# Patient Record
Sex: MALE | Race: WHITE | Employment: OTHER | URBAN - NONMETROPOLITAN AREA
[De-identification: names, ages, dates, MRNs, and addresses within clinical notes are randomized per-mention and may not be internally consistent; named-entity substitution may affect disease eponyms.]

---

## 2017-10-17 ENCOUNTER — HOSPITAL ENCOUNTER (INPATIENT)
Age: 77
LOS: 1 days | Discharge: HOME | DRG: 313 | End: 2017-10-18
Attending: EMERGENCY MEDICINE | Admitting: INTERNAL MEDICINE
Payer: MEDICARE

## 2017-10-17 ENCOUNTER — APPOINTMENT (OUTPATIENT)
Dept: INTERVENTIONAL RADIOLOGY/VASCULAR | Age: 77
DRG: 313 | End: 2017-10-17
Payer: MEDICARE

## 2017-10-17 ENCOUNTER — APPOINTMENT (OUTPATIENT)
Dept: CT IMAGING | Age: 77
DRG: 313 | End: 2017-10-17
Payer: MEDICARE

## 2017-10-17 ENCOUNTER — APPOINTMENT (OUTPATIENT)
Dept: GENERAL RADIOLOGY | Age: 77
DRG: 313 | End: 2017-10-17
Payer: MEDICARE

## 2017-10-17 DIAGNOSIS — R91.1 PULMONARY NODULE: ICD-10-CM

## 2017-10-17 DIAGNOSIS — R79.89 CREATININE ELEVATION: ICD-10-CM

## 2017-10-17 DIAGNOSIS — Z95.1 HISTORY OF CORONARY ARTERY BYPASS SURGERY: ICD-10-CM

## 2017-10-17 DIAGNOSIS — Z72.0 TOBACCO ABUSE: ICD-10-CM

## 2017-10-17 DIAGNOSIS — I10 ESSENTIAL HYPERTENSION: ICD-10-CM

## 2017-10-17 DIAGNOSIS — R91.1 INCIDENTAL PULMONARY NODULE, GREATER THAN OR EQUAL TO 8MM: Primary | ICD-10-CM

## 2017-10-17 DIAGNOSIS — R07.9 CHEST PAIN, UNSPECIFIED TYPE: ICD-10-CM

## 2017-10-17 PROBLEM — R09.89 ABSENT PULSE: Status: ACTIVE | Noted: 2017-10-17

## 2017-10-17 PROBLEM — K40.90 INGUINAL HERNIA: Status: ACTIVE | Noted: 2017-10-17

## 2017-10-17 PROBLEM — K46.9 HERNIA OF ABDOMINAL CAVITY: Status: ACTIVE | Noted: 2017-10-17

## 2017-10-17 LAB
ALBUMIN SERPL-MCNC: 4.6 G/DL (ref 3.5–5.1)
ALP BLD-CCNC: 77 U/L (ref 38–126)
ALT SERPL-CCNC: 17 U/L (ref 11–66)
ANION GAP SERPL CALCULATED.3IONS-SCNC: 13 MEQ/L (ref 8–16)
AST SERPL-CCNC: 24 U/L (ref 5–40)
BASOPHILS # BLD: 0.3 %
BASOPHILS # BLD: 0.3 %
BASOPHILS ABSOLUTE: 0 THOU/MM3 (ref 0–0.1)
BASOPHILS ABSOLUTE: 0 THOU/MM3 (ref 0–0.1)
BILIRUB SERPL-MCNC: 1.3 MG/DL (ref 0.3–1.2)
BILIRUBIN DIRECT: < 0.2 MG/DL (ref 0–0.3)
BUN BLDV-MCNC: 23 MG/DL (ref 7–22)
CALCIUM SERPL-MCNC: 10 MG/DL (ref 8.5–10.5)
CHLORIDE BLD-SCNC: 101 MEQ/L (ref 98–111)
CO2: 28 MEQ/L (ref 23–33)
CREAT SERPL-MCNC: 1.1 MG/DL (ref 0.4–1.2)
EKG ATRIAL RATE: 54 BPM
EKG ATRIAL RATE: 86 BPM
EKG P-R INTERVAL: 204 MS
EKG Q-T INTERVAL: 422 MS
EKG Q-T INTERVAL: 428 MS
EKG QRS DURATION: 106 MS
EKG QRS DURATION: 108 MS
EKG QTC CALCULATION (BAZETT): 400 MS
EKG QTC CALCULATION (BAZETT): 455 MS
EKG R AXIS: -26 DEGREES
EKG R AXIS: -32 DEGREES
EKG T AXIS: 17 DEGREES
EKG T AXIS: 38 DEGREES
EKG VENTRICULAR RATE: 54 BPM
EKG VENTRICULAR RATE: 68 BPM
EOSINOPHIL # BLD: 2 %
EOSINOPHIL # BLD: 2.1 %
EOSINOPHILS ABSOLUTE: 0.2 THOU/MM3 (ref 0–0.4)
EOSINOPHILS ABSOLUTE: 0.2 THOU/MM3 (ref 0–0.4)
GFR SERPL CREATININE-BSD FRML MDRD: 65 ML/MIN/1.73M2
GLUCOSE BLD-MCNC: 101 MG/DL (ref 70–108)
HCT VFR BLD CALC: 41.3 % (ref 42–52)
HCT VFR BLD CALC: 44.4 % (ref 42–52)
HEMOGLOBIN: 14.4 GM/DL (ref 14–18)
HEMOGLOBIN: 14.9 GM/DL (ref 14–18)
LIPASE: 70.3 U/L (ref 5.6–51.3)
LV EF: 58 %
LVEF MODALITY: NORMAL
LYMPHOCYTES # BLD: 21.1 %
LYMPHOCYTES # BLD: 21.9 %
LYMPHOCYTES ABSOLUTE: 1.7 THOU/MM3 (ref 1–4.8)
LYMPHOCYTES ABSOLUTE: 1.9 THOU/MM3 (ref 1–4.8)
MAGNESIUM: 2.3 MG/DL (ref 1.6–2.4)
MCH RBC QN AUTO: 30.4 PG (ref 27–31)
MCH RBC QN AUTO: 31.6 PG (ref 27–31)
MCHC RBC AUTO-ENTMCNC: 33.5 GM/DL (ref 33–37)
MCHC RBC AUTO-ENTMCNC: 34.8 GM/DL (ref 33–37)
MCV RBC AUTO: 90.7 FL (ref 80–94)
MCV RBC AUTO: 90.9 FL (ref 80–94)
MONOCYTES # BLD: 8.1 %
MONOCYTES # BLD: 9.3 %
MONOCYTES ABSOLUTE: 0.6 THOU/MM3 (ref 0.4–1.3)
MONOCYTES ABSOLUTE: 0.8 THOU/MM3 (ref 0.4–1.3)
NUCLEATED RED BLOOD CELLS: 0 /100 WBC
NUCLEATED RED BLOOD CELLS: 0 /100 WBC
OSMOLALITY CALCULATION: 286.9 MOSMOL/KG (ref 275–300)
PDW BLD-RTO: 13.3 % (ref 11.5–14.5)
PDW BLD-RTO: 14 % (ref 11.5–14.5)
PLATELET # BLD: 203 THOU/MM3 (ref 130–400)
PLATELET # BLD: 232 THOU/MM3 (ref 130–400)
PMV BLD AUTO: 9.3 MCM (ref 7.4–10.4)
PMV BLD AUTO: 9.5 MCM (ref 7.4–10.4)
POTASSIUM SERPL-SCNC: 4.4 MEQ/L (ref 3.5–5.2)
PRO-BNP: 575.2 PG/ML (ref 0–1800)
RBC # BLD: 4.55 MILL/MM3 (ref 4.7–6.1)
RBC # BLD: 4.89 MILL/MM3 (ref 4.7–6.1)
RBC # BLD: NORMAL 10*6/UL
RBC # BLD: NORMAL 10*6/UL
SEG NEUTROPHILS: 67.3 %
SEG NEUTROPHILS: 67.6 %
SEGMENTED NEUTROPHILS ABSOLUTE COUNT: 5.3 THOU/MM3 (ref 1.8–7.7)
SEGMENTED NEUTROPHILS ABSOLUTE COUNT: 6 THOU/MM3 (ref 1.8–7.7)
SODIUM BLD-SCNC: 142 MEQ/L (ref 135–145)
TOTAL PROTEIN: 7.7 G/DL (ref 6.1–8)
TROPONIN T: < 0.01 NG/ML
WBC # BLD: 7.9 THOU/MM3 (ref 4.8–10.8)
WBC # BLD: 8.9 THOU/MM3 (ref 4.8–10.8)

## 2017-10-17 PROCEDURE — 93922 UPR/L XTREMITY ART 2 LEVELS: CPT

## 2017-10-17 PROCEDURE — 83690 ASSAY OF LIPASE: CPT

## 2017-10-17 PROCEDURE — 82248 BILIRUBIN DIRECT: CPT

## 2017-10-17 PROCEDURE — 96374 THER/PROPH/DIAG INJ IV PUSH: CPT

## 2017-10-17 PROCEDURE — 6360000002 HC RX W HCPCS: Performed by: HOSPITALIST

## 2017-10-17 PROCEDURE — B32T1ZZ COMPUTERIZED TOMOGRAPHY (CT SCAN) OF LEFT PULMONARY ARTERY USING LOW OSMOLAR CONTRAST: ICD-10-PCS | Performed by: RADIOLOGY

## 2017-10-17 PROCEDURE — 99221 1ST HOSP IP/OBS SF/LOW 40: CPT | Performed by: SURGERY

## 2017-10-17 PROCEDURE — 99285 EMERGENCY DEPT VISIT HI MDM: CPT

## 2017-10-17 PROCEDURE — 83880 ASSAY OF NATRIURETIC PEPTIDE: CPT

## 2017-10-17 PROCEDURE — 99221 1ST HOSP IP/OBS SF/LOW 40: CPT | Performed by: INTERNAL MEDICINE

## 2017-10-17 PROCEDURE — 85025 COMPLETE CBC W/AUTO DIFF WBC: CPT

## 2017-10-17 PROCEDURE — 36415 COLL VENOUS BLD VENIPUNCTURE: CPT

## 2017-10-17 PROCEDURE — 71275 CT ANGIOGRAPHY CHEST: CPT

## 2017-10-17 PROCEDURE — 99223 1ST HOSP IP/OBS HIGH 75: CPT | Performed by: INTERNAL MEDICINE

## 2017-10-17 PROCEDURE — 6370000000 HC RX 637 (ALT 250 FOR IP): Performed by: INTERNAL MEDICINE

## 2017-10-17 PROCEDURE — 93306 TTE W/DOPPLER COMPLETE: CPT

## 2017-10-17 PROCEDURE — 93005 ELECTROCARDIOGRAM TRACING: CPT | Performed by: EMERGENCY MEDICINE

## 2017-10-17 PROCEDURE — 6360000002 HC RX W HCPCS: Performed by: EMERGENCY MEDICINE

## 2017-10-17 PROCEDURE — 6370000000 HC RX 637 (ALT 250 FOR IP): Performed by: HOSPITALIST

## 2017-10-17 PROCEDURE — 74174 CTA ABD&PLVS W/CONTRAST: CPT

## 2017-10-17 PROCEDURE — 84484 ASSAY OF TROPONIN QUANT: CPT

## 2017-10-17 PROCEDURE — 6360000004 HC RX CONTRAST MEDICATION: Performed by: INTERNAL MEDICINE

## 2017-10-17 PROCEDURE — B32S1ZZ COMPUTERIZED TOMOGRAPHY (CT SCAN) OF RIGHT PULMONARY ARTERY USING LOW OSMOLAR CONTRAST: ICD-10-PCS | Performed by: RADIOLOGY

## 2017-10-17 PROCEDURE — B4201ZZ COMPUTERIZED TOMOGRAPHY (CT SCAN) OF ABDOMINAL AORTA USING LOW OSMOLAR CONTRAST: ICD-10-PCS | Performed by: RADIOLOGY

## 2017-10-17 PROCEDURE — 83735 ASSAY OF MAGNESIUM: CPT

## 2017-10-17 PROCEDURE — 71010 XR CHEST PORTABLE: CPT

## 2017-10-17 PROCEDURE — 6370000000 HC RX 637 (ALT 250 FOR IP): Performed by: EMERGENCY MEDICINE

## 2017-10-17 PROCEDURE — C9113 INJ PANTOPRAZOLE SODIUM, VIA: HCPCS | Performed by: EMERGENCY MEDICINE

## 2017-10-17 PROCEDURE — B3201ZZ COMPUTERIZED TOMOGRAPHY (CT SCAN) OF THORACIC AORTA USING LOW OSMOLAR CONTRAST: ICD-10-PCS | Performed by: RADIOLOGY

## 2017-10-17 PROCEDURE — 96375 TX/PRO/DX INJ NEW DRUG ADDON: CPT

## 2017-10-17 PROCEDURE — 99232 SBSQ HOSP IP/OBS MODERATE 35: CPT | Performed by: HOSPITALIST

## 2017-10-17 PROCEDURE — 2140000000 HC CCU INTERMEDIATE R&B

## 2017-10-17 PROCEDURE — 80053 COMPREHEN METABOLIC PANEL: CPT

## 2017-10-17 RX ORDER — METOPROLOL SUCCINATE 50 MG/1
37.5 TABLET, EXTENDED RELEASE ORAL DAILY
Status: ON HOLD | COMMUNITY
End: 2017-10-17 | Stop reason: DRUGHIGH

## 2017-10-17 RX ORDER — METOPROLOL SUCCINATE 25 MG/1
25 TABLET, EXTENDED RELEASE ORAL DAILY
Status: DISCONTINUED | OUTPATIENT
Start: 2017-10-17 | End: 2017-10-17

## 2017-10-17 RX ORDER — METOPROLOL SUCCINATE 50 MG/1
50 TABLET, EXTENDED RELEASE ORAL DAILY
Status: DISCONTINUED | OUTPATIENT
Start: 2017-10-17 | End: 2017-10-17

## 2017-10-17 RX ORDER — ONDANSETRON 2 MG/ML
4 INJECTION INTRAMUSCULAR; INTRAVENOUS ONCE
Status: COMPLETED | OUTPATIENT
Start: 2017-10-17 | End: 2017-10-17

## 2017-10-17 RX ORDER — METOPROLOL TARTRATE 50 MG/1
25 TABLET, FILM COATED ORAL 2 TIMES DAILY
COMMUNITY

## 2017-10-17 RX ORDER — HYDRALAZINE HYDROCHLORIDE 20 MG/ML
10 INJECTION INTRAMUSCULAR; INTRAVENOUS EVERY 6 HOURS PRN
Status: DISCONTINUED | OUTPATIENT
Start: 2017-10-17 | End: 2017-10-17 | Stop reason: HOSPADM

## 2017-10-17 RX ORDER — LOVASTATIN 40 MG/1
40 TABLET ORAL NIGHTLY
Status: DISCONTINUED | OUTPATIENT
Start: 2017-10-17 | End: 2017-10-17 | Stop reason: CLARIF

## 2017-10-17 RX ORDER — SIMVASTATIN 20 MG
20 TABLET ORAL NIGHTLY
Status: DISCONTINUED | OUTPATIENT
Start: 2017-10-17 | End: 2017-10-18 | Stop reason: HOSPADM

## 2017-10-17 RX ORDER — NITROGLYCERIN 0.4 MG/1
0.4 TABLET SUBLINGUAL EVERY 5 MIN PRN
Status: DISCONTINUED | OUTPATIENT
Start: 2017-10-17 | End: 2017-10-18 | Stop reason: HOSPADM

## 2017-10-17 RX ORDER — LOVASTATIN 40 MG/1
40 TABLET ORAL NIGHTLY
COMMUNITY

## 2017-10-17 RX ORDER — PANTOPRAZOLE SODIUM 40 MG/10ML
40 INJECTION, POWDER, LYOPHILIZED, FOR SOLUTION INTRAVENOUS ONCE
Status: COMPLETED | OUTPATIENT
Start: 2017-10-17 | End: 2017-10-17

## 2017-10-17 RX ORDER — AMLODIPINE BESYLATE 2.5 MG/1
2.5 TABLET ORAL NIGHTLY
Status: DISCONTINUED | OUTPATIENT
Start: 2017-10-17 | End: 2017-10-18 | Stop reason: HOSPADM

## 2017-10-17 RX ADMIN — ONDANSETRON 4 MG: 2 INJECTION INTRAMUSCULAR; INTRAVENOUS at 02:30

## 2017-10-17 RX ADMIN — IOPAMIDOL 80 ML: 755 INJECTION, SOLUTION INTRAVENOUS at 04:11

## 2017-10-17 RX ADMIN — AMLODIPINE BESYLATE 2.5 MG: 2.5 TABLET ORAL at 21:13

## 2017-10-17 RX ADMIN — METOPROLOL TARTRATE 25 MG: 25 TABLET ORAL at 21:13

## 2017-10-17 RX ADMIN — SIMVASTATIN 20 MG: 20 TABLET, FILM COATED ORAL at 21:13

## 2017-10-17 RX ADMIN — NITROGLYCERIN 0.4 MG: 0.4 TABLET SUBLINGUAL at 02:27

## 2017-10-17 RX ADMIN — PANTOPRAZOLE SODIUM 40 MG: 40 INJECTION, POWDER, FOR SOLUTION INTRAVENOUS at 02:32

## 2017-10-17 RX ADMIN — ENOXAPARIN SODIUM 40 MG: 40 INJECTION SUBCUTANEOUS at 16:58

## 2017-10-17 RX ADMIN — METOPROLOL SUCCINATE 25 MG: 25 TABLET, FILM COATED, EXTENDED RELEASE ORAL at 09:12

## 2017-10-17 ASSESSMENT — ENCOUNTER SYMPTOMS
EYE DISCHARGE: 0
VOICE CHANGE: 0
CONSTIPATION: 0
CHEST TIGHTNESS: 0
VOMITING: 0
ABDOMINAL PAIN: 0
CHOKING: 0
EYE REDNESS: 0
DIARRHEA: 0
SORE THROAT: 0
SINUS PRESSURE: 0
EYE ITCHING: 0
SHORTNESS OF BREATH: 0
COUGH: 0
ABDOMINAL DISTENTION: 0
RHINORRHEA: 0
NAUSEA: 0
WHEEZING: 0
PHOTOPHOBIA: 0
TROUBLE SWALLOWING: 0
BLOOD IN STOOL: 0
EYE PAIN: 0
BACK PAIN: 0

## 2017-10-17 ASSESSMENT — PAIN SCALES - GENERAL
PAINLEVEL_OUTOF10: 2
PAINLEVEL_OUTOF10: 0
PAINLEVEL_OUTOF10: 0
PAINLEVEL_OUTOF10: 6

## 2017-10-17 ASSESSMENT — PAIN DESCRIPTION - LOCATION
LOCATION: CHEST
LOCATION: CHEST

## 2017-10-17 ASSESSMENT — PAIN DESCRIPTION - DESCRIPTORS
DESCRIPTORS: HEAVINESS
DESCRIPTORS: HEAVINESS

## 2017-10-17 ASSESSMENT — PAIN DESCRIPTION - PAIN TYPE
TYPE: ACUTE PAIN
TYPE: ACUTE PAIN

## 2017-10-17 NOTE — CONSULTS
shows irregular 13mm nodule RUL - pulmonary was consulted for further eval.  Past Medical History         Diagnosis Date    Hyperlipidemia     Hypertension       Past Surgical History           Procedure Laterality Date    CARDIAC SURGERY      FRACTURE SURGERY      HERNIA REPAIR       Diet    DIET CARDIAC; Allergies    Aspirin  Social History     Social History     Social History    Marital status:      Spouse name: N/A    Number of children: N/A    Years of education: N/A     Occupational History    Not on file. Social History Main Topics    Smoking status: Former Smoker    Smokeless tobacco: Never Used    Alcohol use No    Drug use: No    Sexual activity: Not on file     Other Topics Concern    Not on file     Social History Narrative    No narrative on file     Family History    History reviewed. No pertinent family history. Sleep History    n/a  ROS    General/Constitutional: No recent loss of weight or appetite changes. No fever or chills. HENT: Negative. Eyes: Negative. Upper respiratory tract: No nasal stuffiness or post nasal drip. Lower respiratory tract/ lungs: No cough or sputum production. No hemoptysis. Cardiovascular: No palpitations, chest pain or edema. Gastrointestinal: No nausea or vomiting. Neurological: No focal neurological weakness. Extremities: No tenderness. Musculoskeletal: negative   Genitourinary: No complaints. Hematological: Negative. Denies easy buising  Skin: No itching. Meds    Current Medications    metoprolol succinate  25 mg Oral Daily    simvastatin  20 mg Oral Nightly     nitroGLYCERIN  IV Drips/Infusions     Vitals    Vitals    height is 5' 11\" (1.803 m) and weight is 157 lb 14.4 oz (71.6 kg). His oral temperature is 98 °F (36.7 °C). His blood pressure is 148/70 (abnormal) and his pulse is 51. His respiration is 20 and oxygen saturation is 98%.         I/O  No intake or output data in the 24 hours ending 10/17/17 0818  Patient Vitals for the past 96 hrs (Last 3 readings):   Weight   10/17/17 0444 157 lb 14.4 oz (71.6 kg)   10/17/17 0211 155 lb (70.3 kg)     Exam   Constitutional: Patient appears moderately built and moderately nourished. No distress on RA   Head: Normocephalic and atraumatic. Mouth/Throat: Oropharynx is clear and moist.  No oral thrush. Eyes: Conjunctivae are normal. Pupils are equal, round, and reactive to light. No scleral icterus. Neck: Neck supple. No JVD or tracheal deviation present. Cardiovascular: Regular rate, irregular rhythm, S1 and S2 with no murmur. No peripheral edema  Pulmonary/Chest: Normal effort with clear breath sounds. No stridor. No respiratory distress. Abdominal: Soft. Bowel sounds audible. No distension or tenderness to palp  Musculoskeletal: Moves all extremities  Neurological: Patient is alert and oriented to person, place, and time. Skin: Skin is warm and dry. Labs   ABG  No results found for: PH, PO2, PCO2, HCO3, O2SAT  No results found for: IFIO2, MODE, SETTIDVOL, SETPEEP  CBC  Recent Labs      10/17/17   0151  10/17/17   0544   WBC  8.9  7.9   RBC  4.89  4.55*   HGB  14.9  14.4   HCT  44.4  41.3*   MCV  90.7  90.9   MCH  30.4  31.6*   MCHC  33.5  34.8   RDW  14.0  13.3   PLT  232  203   MPV  9.3  9.5      BMP  Recent Labs      10/17/17   0151   NA  142   K  4.4   CL  101   CO2  28   BUN  23*   CREATININE  1.1   GLUCOSE  101   MG  2.3   CALCIUM  10.0     LFT  Recent Labs      10/17/17   0151   AST  24   ALT  17   BILITOT  1.3*   ALKPHOS  77   LIPASE  70.3*     TROP  Lab Results   Component Value Date    TROPONINT < 0.010 10/17/2017     BNP  Lab Results   Component Value Date    PROBNP 575.2 10/17/2017     D-Dimer  No results found for: DDIMER  Lactic Acid  No results for input(s): LACTA in the last 72 hours. INR  No results for input(s): INR, PROTIME in the last 72 hours. PTT  No results for input(s): APTT in the last 72 hours.   Glucose  No results for input(s): POCGLU in the last 72 oxygenating well on RA   -No s/s of current infectious process- does have history c/w possible pneumonia in recent past, treated by his PCP- this nodule may be resolving CAP. -Recommend f/u in 3 months with repeat CT w/ contrast to follow lung nodule    Thank you for the consult and allowing us to participate in the care of your patient. Case discussed with nurse and patient/family. Questions and concerns addressed. Meds and Orders reviewed. Electronically signed by     Niranjan Oviedo CNP on 10/17/2017 at 8:18 AM    Addendum by Dr. Jordan Zamora MD:  I have seen and examined the patient independently. Face to face evaluation and examination was performed. The above evaluation and note has been reviewed. Labs and radiographs were reviewed. I Have discussed with Ms. Evelyn Vallecillo CNP about this patient in detail. D/w Patient's R.N. and specific instructions given. Patient issues addressed. The above assessment and plan has been reviewed. Please see my modifications mentioned below. My modifications:  No distress. The nodule may be resolving pneumonia. Follow up as above along with full PFTS. -Follow with my ( Dr. Jeffrey Pearson) clinic at [facility] for pulmonary medicine in  3months with chest CT with IV contrast and full PFTs before clinic visit to follow pneumonia Vs lung nodule.     Carlos Cobos MD 10/17/2017 7:10 PM

## 2017-10-17 NOTE — ED PROVIDER NOTES
[facility]  eMERGENCY dEPARTMENT eNCOUnter          CHIEF COMPLAINT       Chief Complaint   Patient presents with    Chest Pain    Hypertension    Fatigue       Nurses Notes reviewed and I agree except as noted in the HPI. HISTORY OF PRESENT ILLNESS    Natalia Esposito is a 64 y.o. male who presents to the Emergency Department for the evaluation of chest pain. Patient has a history of Hypertension, Hyperlipidemia, and Cardiac surgery (quadruple bypass in 80' and triple bypass in 11'). Patient states he has had issues with chest pressure over the last year. He states most nights he wakes up with issues every 1-2 hours with chest pressure and difficulty exhaling. Patient states lately blood pressure has been running high. Patient currently complains of chest heaviness and chills. He describes his pain as being constant and heaviness and rates it a 6/10 in severity. He denies any syncope, shortness of breath, fever, cough, dizziness, or lightheadedness. Patient states he has been taking blood pressure medications as prescribed. Patient states he has an appointment with Dr. Kristina Fall tomorrow. Patient states cardiologist is Dr. Juan Ramirez. Patient has no other complaints at this time. The HPI was provided by the patient. REVIEW OF SYSTEMS     Review of Systems   Constitutional: Positive for chills. Negative for activity change, appetite change, diaphoresis, fatigue and unexpected weight change. HENT: Negative for congestion, ear discharge, ear pain, hearing loss, rhinorrhea, sinus pressure, sore throat, trouble swallowing and voice change. Eyes: Negative for photophobia, pain, discharge, redness and itching. Respiratory: Negative for cough, choking, chest tightness, shortness of breath and wheezing. Cardiovascular: Positive for chest pain (heaviness). Negative for palpitations and leg swelling.    Gastrointestinal: Negative for abdominal distention, abdominal pain, blood in stool, constipation, diarrhea, nausea and vomiting. Endocrine: Negative for polydipsia, polyphagia and polyuria. Genitourinary: Negative for decreased urine volume, difficulty urinating, dysuria, enuresis, frequency, hematuria and urgency. Musculoskeletal: Negative for arthralgias, back pain, gait problem, myalgias, neck pain and neck stiffness. Skin: Negative for pallor and rash. Allergic/Immunologic: Negative for immunocompromised state. Neurological: Negative for dizziness, tremors, seizures, syncope, facial asymmetry, weakness, light-headedness, numbness and headaches. Hematological: Negative for adenopathy. Does not bruise/bleed easily. Psychiatric/Behavioral: Negative for agitation, hallucinations and suicidal ideas. The patient is not nervous/anxious. PAST MEDICAL HISTORY    has a past medical history of Hyperlipidemia and Hypertension. SURGICAL HISTORY      has a past surgical history that includes Cardiac surgery; hernia repair; and fracture surgery. CURRENT MEDICATIONS       Previous Medications    LOVASTATIN (MEVACOR) 40 MG TABLET    Take 40 mg by mouth nightly    METOPROLOL SUCCINATE (TOPROL XL) 50 MG EXTENDED RELEASE TABLET    Take 50 mg by mouth daily Take 1/2 tab by mouth two times a day       ALLERGIES     has No Known Allergies. FAMILY HISTORY     has no family status information on file. family history is not on file. SOCIAL HISTORY      reports that he has quit smoking. He has never used smokeless tobacco. He reports that he does not drink alcohol or use drugs. PHYSICAL EXAM     INITIAL VITALS:  height is 5' 11\" (1.803 m) and weight is 155 lb (70.3 kg). His oral temperature is 97.8 °F (36.6 °C). His blood pressure is 157/66 (abnormal) and his pulse is 52. His respiration is 16 and oxygen saturation is 98%. Physical Exam   Constitutional: He is oriented to person, place, and time. He appears well-developed and well-nourished. No distress.    HENT:   Head: Patient does have bradycardia here his EKG was strange. It appears that he is in sinus 1 minute and then he has sinus arrhythmia another minute. His NJ interval does appear elongated on several of the EKGs. At this point I called and spoke with cardiology with Dr. Ashwini Stern, and discussed case with him. He graciously accepted the patient as consult. I then called and spoke with Dr. España Morning and discussed case with him he graciously accepted the patient for admission. Patient will be admitted in stable condition to the service of hospitalist with cardiology consult and place. CRITICAL CARE:   None     CONSULTS:  Dr Ashwini Stern    PROCEDURES:  None    FINAL IMPRESSION      1. Chest pain, unspecified type    2. History of coronary artery bypass surgery    3. Essential hypertension          DISPOSITION/PLAN   Admission    PATIENT REFERRED TO:  No follow-up provider specified. DISCHARGE MEDICATIONS:  New Prescriptions    No medications on file       (Please note that portions of this note were completed with a voice recognition program.  Efforts were made to edit the dictations but occasionally words are mis-transcribed.)    Scribe:  Lucio Elizondo 10/17/17 2:11 AM Scribing for and in the presence of Adolfo Luna DO. Signed: Edmund Adams. 10/17/17 2:11 AM    Provider:  I personally performed the services described in the documentation, reviewed and edited the documentation which was dictated to the scribe in my presence, and it accurately records my words and actions.     Adolfo Luna DO 10/17/17 3:06 AM        Adolfo Luna DO  10/17/17 5233

## 2017-10-17 NOTE — CONSULTS
[facility] [address] [place] [address]  Cardiology Consult        Patient:  Real Galeazzi  YOB: 1940  MRN: [id number]     Acct: [de-identified]    PCP: Barbra Favre, MD    Date of Admission: 10/17/2017      Chief Complaint:  Chest pain      History Of Present Illness:    64 y.o. male c CAD s/p CABG x4 (1997, redo 2011), HTN, HLD who presented to [facility] with chest pains. As per patient, the chest pain started for 1 year, heaviness in nature, 5/10, midsternal, nonradiating, constant, no aggravating or alleviating factors. Patient had cath in 2011 and no recent caths. He has been following with [facility] and now he wants to establish his care here. Denies fevers, chills, cough, sweats, N/V/D      Past Medical History:          Diagnosis Date    Hyperlipidemia     Hypertension        Past Surgical History:          Procedure Laterality Date    CARDIAC SURGERY      FRACTURE SURGERY      HERNIA REPAIR         Medications Prior to Admission:      Prior to Admission medications    Medication Sig Start Date End Date Taking? Authorizing Provider   metoprolol succinate (TOPROL XL) 50 MG extended release tablet Take 37.5 mg by mouth daily Take 1/2 tab by mouth two times a day    Yes Historical Provider, MD   lovastatin (MEVACOR) 40 MG tablet Take 40 mg by mouth nightly   Yes Historical Provider, MD       Current Facility-Administered Medications   Medication Dose Route Frequency Provider Last Rate Last Dose    nitroGLYCERIN (NITROSTAT) SL tablet 0.4 mg  0.4 mg Sublingual Q5 Min PRN Candis Alexander DO   0.4 mg at 10/17/17 0227    metoprolol succinate (TOPROL XL) extended release tablet 25 mg  25 mg Oral Daily Oscar Echeverria MD        simvastatin (ZOCOR) tablet 20 mg  20 mg Oral Nightly Oscar Echeverria MD           Allergies:  Aspirin    Social History:    TOBACCO:   reports that he has quit smoking.  He has never used smokeless tobacco.  ETOH:   reports that he does not drink alcohol. Family History:    History reviewed. No pertinent family history.       12 point ROS is negative other than what is stated in HPI    BP (!) 148/70   Pulse 51   Temp 98 °F (36.7 °C) (Oral)   Resp 20   Ht 5' 11\" (1.803 m)   Wt 157 lb 14.4 oz (71.6 kg)   SpO2 98%   BMI 22.02 kg/m²       Physical Examination: General appearance - alert, well appearing, and in no distress  Mental status - alert, oriented to person, place, and time  Neck - supple, no significant adenopathy, no JVD, or carotid bruits  Chest - clear to auscultation, no wheezes, rales or rhonchi, symmetric air entry  Heart - normal rate, regular rhythm, normal S1, S2, no murmurs, rubs, clicks or gallops  Abdomen - soft, nontender, nondistended, no masses or organomegaly  Neurological - alert, oriented, normal speech, no focal findings or movement disorder noted  Musculoskeletal - no joint tenderness, deformity or swelling  Extremities - peripheral pulses normal, no pedal edema, no clubbing or cyanosis  Skin - normal coloration and turgor, no rashes, no suspicious skin lesions noted      LABS:    Recent Labs      10/17/17   0151   TROPONINT  < 0.010     CBC:   Lab Results   Component Value Date    WBC 7.9 10/17/2017    RBC 4.55 10/17/2017    HGB 14.4 10/17/2017    HCT 41.3 10/17/2017    MCV 90.9 10/17/2017    MCH 31.6 10/17/2017    MCHC 34.8 10/17/2017    RDW 13.3 10/17/2017     10/17/2017    MPV 9.5 10/17/2017     BMP:    Lab Results   Component Value Date     10/17/2017    K 4.4 10/17/2017     10/17/2017    CO2 28 10/17/2017    BUN 23 10/17/2017    LABALBU 4.6 10/17/2017    CREATININE 1.1 10/17/2017    CALCIUM 10.0 10/17/2017    LABGLOM 65 10/17/2017    GLUCOSE 101 10/17/2017     Hepatic Function Panel:    Lab Results   Component Value Date    ALKPHOS 77 10/17/2017    ALT 17 10/17/2017    AST 24 10/17/2017    PROT 7.7 10/17/2017    BILITOT 1.3 10/17/2017    BILIDIR <0.2 10/17/2017    LABALBU 4.6

## 2017-10-18 ENCOUNTER — APPOINTMENT (OUTPATIENT)
Dept: NON INVASIVE DIAGNOSTICS | Age: 77
DRG: 313 | End: 2017-10-18
Payer: MEDICARE

## 2017-10-18 VITALS
HEART RATE: 54 BPM | HEIGHT: 71 IN | WEIGHT: 157.9 LBS | DIASTOLIC BLOOD PRESSURE: 74 MMHG | RESPIRATION RATE: 16 BRPM | OXYGEN SATURATION: 98 % | BODY MASS INDEX: 22.1 KG/M2 | TEMPERATURE: 98.3 F | SYSTOLIC BLOOD PRESSURE: 166 MMHG

## 2017-10-18 LAB
ALBUMIN SERPL-MCNC: 4.2 G/DL (ref 3.5–5.1)
ANION GAP SERPL CALCULATED.3IONS-SCNC: 15 MEQ/L (ref 8–16)
BASOPHILS # BLD: 0.3 %
BASOPHILS ABSOLUTE: 0 THOU/MM3 (ref 0–0.1)
BUN BLDV-MCNC: 21 MG/DL (ref 7–22)
CALCIUM SERPL-MCNC: 9.7 MG/DL (ref 8.5–10.5)
CHLORIDE BLD-SCNC: 102 MEQ/L (ref 98–111)
CO2: 27 MEQ/L (ref 23–33)
CREAT SERPL-MCNC: 1.1 MG/DL (ref 0.4–1.2)
EOSINOPHIL # BLD: 2.3 %
EOSINOPHILS ABSOLUTE: 0.2 THOU/MM3 (ref 0–0.4)
GFR SERPL CREATININE-BSD FRML MDRD: 65 ML/MIN/1.73M2
GLUCOSE BLD-MCNC: 94 MG/DL (ref 70–108)
HCT VFR BLD CALC: 42.2 % (ref 42–52)
HEMOGLOBIN: 14.7 GM/DL (ref 14–18)
LYMPHOCYTES # BLD: 22.3 %
LYMPHOCYTES ABSOLUTE: 1.9 THOU/MM3 (ref 1–4.8)
MCH RBC QN AUTO: 31.8 PG (ref 27–31)
MCHC RBC AUTO-ENTMCNC: 34.7 GM/DL (ref 33–37)
MCV RBC AUTO: 91.6 FL (ref 80–94)
MONOCYTES # BLD: 8.8 %
MONOCYTES ABSOLUTE: 0.7 THOU/MM3 (ref 0.4–1.3)
NUCLEATED RED BLOOD CELLS: 0 /100 WBC
PDW BLD-RTO: 13.5 % (ref 11.5–14.5)
PHOSPHORUS: 3.3 MG/DL (ref 2.4–4.7)
PLATELET # BLD: 196 THOU/MM3 (ref 130–400)
PMV BLD AUTO: 9.6 MCM (ref 7.4–10.4)
POTASSIUM SERPL-SCNC: 4.3 MEQ/L (ref 3.5–5.2)
RBC # BLD: 4.61 MILL/MM3 (ref 4.7–6.1)
RBC # BLD: NORMAL 10*6/UL
SEG NEUTROPHILS: 66.3 %
SEGMENTED NEUTROPHILS ABSOLUTE COUNT: 5.6 THOU/MM3 (ref 1.8–7.7)
SODIUM BLD-SCNC: 144 MEQ/L (ref 135–145)
WBC # BLD: 8.4 THOU/MM3 (ref 4.8–10.8)

## 2017-10-18 PROCEDURE — 93017 CV STRESS TEST TRACING ONLY: CPT

## 2017-10-18 PROCEDURE — 6360000002 HC RX W HCPCS: Performed by: HOSPITALIST

## 2017-10-18 PROCEDURE — 6360000002 HC RX W HCPCS: Performed by: INTERNAL MEDICINE

## 2017-10-18 PROCEDURE — 99231 SBSQ HOSP IP/OBS SF/LOW 25: CPT | Performed by: SURGERY

## 2017-10-18 PROCEDURE — 80069 RENAL FUNCTION PANEL: CPT

## 2017-10-18 PROCEDURE — 78452 HT MUSCLE IMAGE SPECT MULT: CPT

## 2017-10-18 PROCEDURE — 6370000000 HC RX 637 (ALT 250 FOR IP): Performed by: HOSPITALIST

## 2017-10-18 PROCEDURE — 3430000000 HC RX DIAGNOSTIC RADIOPHARMACEUTICAL: Performed by: NURSE PRACTITIONER

## 2017-10-18 PROCEDURE — 36415 COLL VENOUS BLD VENIPUNCTURE: CPT

## 2017-10-18 PROCEDURE — 85025 COMPLETE CBC W/AUTO DIFF WBC: CPT

## 2017-10-18 PROCEDURE — 99239 HOSP IP/OBS DSCHRG MGMT >30: CPT | Performed by: HOSPITALIST

## 2017-10-18 PROCEDURE — 6360000002 HC RX W HCPCS

## 2017-10-18 PROCEDURE — 99232 SBSQ HOSP IP/OBS MODERATE 35: CPT | Performed by: INTERNAL MEDICINE

## 2017-10-18 PROCEDURE — A9500 TC99M SESTAMIBI: HCPCS | Performed by: NURSE PRACTITIONER

## 2017-10-18 RX ORDER — AMLODIPINE BESYLATE 2.5 MG/1
2.5 TABLET ORAL NIGHTLY
Qty: 30 TABLET | Refills: 3 | Status: SHIPPED | OUTPATIENT
Start: 2017-10-18

## 2017-10-18 RX ADMIN — Medication 33.3 MILLICURIE: at 09:50

## 2017-10-18 RX ADMIN — ENOXAPARIN SODIUM 40 MG: 40 INJECTION SUBCUTANEOUS at 07:53

## 2017-10-18 RX ADMIN — METOPROLOL TARTRATE 25 MG: 25 TABLET ORAL at 07:51

## 2017-10-18 RX ADMIN — REGADENOSON 0.4 MG: 0.08 INJECTION, SOLUTION INTRAVENOUS at 11:31

## 2017-10-18 RX ADMIN — Medication 10.3 MILLICURIE: at 08:55

## 2017-10-18 NOTE — PLAN OF CARE
Problem: Safety:  Goal: Free from accidental physical injury  Free from accidental physical injury   Outcome: Ongoing  Patient calls out when in need of assistance. Steady gait. No IV fluids running. Assessment & interventions provided throughout shift. Bed locked & in low position, call light in reach, side-rails up x2, non-slip socks on when ambulating, reminded patient to use call light to call for assistance. Problem: Daily Care:  Goal: Daily care needs are met  Daily care needs are met   Outcome: Ongoing  Patient ADL's are assisted with and set up provided. Problem: Pain:  Goal: Patient's pain/discomfort is manageable  Patient's pain/discomfort is manageable   Outcome: Ongoing  Patient denies pain. States feeling much better than before coming to hospital.     Problem: Discharge Planning:  Goal: Patients continuum of care needs are met  Patients continuum of care needs are met   Outcome: Ongoing  The current medical regimen is effective;  continue present plan and medications. Possible stress test today? Comments: Care plan reviewed with patient. Patient verbalizes understanding of the care plan and contributed to goal setting.

## 2017-10-18 NOTE — DISCHARGE SUMMARY
planning. Patient and or family  verbalized discharge instruction. Available consultant are in agreement with discharge planning. Exam:     Vitals:  Vitals:    10/18/17 0407 10/18/17 0749 10/18/17 1127 10/18/17 1529   BP: (!) 157/70 (!) 179/79 (!) 173/81 (!) 166/74   Pulse: 56 57 50 54   Resp: 18 18 16 16   Temp: 97.7 °F (36.5 °C) 97.8 °F (36.6 °C) 97.3 °F (36.3 °C) 98.3 °F (36.8 °C)   TempSrc: Oral Oral Oral Oral   SpO2: 95% 96% 99% 98%   Weight:       Height:         Weight: Weight: 157 lb 14.4 oz (71.6 kg)     24 hour intake/output:  Intake/Output Summary (Last 24 hours) at 10/18/17 1830  Last data filed at 10/18/17 1345   Gross per 24 hour   Intake              740 ml   Output             2475 ml   Net            -1735 ml         Gen: Not in distress. Alert. Head: Normocephalic. Atraumatic. HEET: Oral mucosa moist, no deformity   Neck: No JVD. No obvious thyromegaly. CVS: Nml S1S2, no MRG, RRR  Pulmomary: Clear bilaterally. No crackles. No wheezes. Gastrointestinal: Soft, NT/ND. Positive bowel sounds. Musculoskeletal: No edema. Warm  Neuro: No focal deficit. Moves extremity spontaneously. Psychiatry: Appropriate affect. Not agitated. Labs: For convenience and continuity at follow-up the following most recent labs are provided:      CBC:    Lab Results   Component Value Date    WBC 8.4 10/18/2017    HGB 14.7 10/18/2017    HCT 42.2 10/18/2017     10/18/2017       Renal:  Lab Results   Component Value Date     10/18/2017    K 4.3 10/18/2017     10/18/2017    CO2 27 10/18/2017    BUN 21 10/18/2017    CREATININE 1.1 10/18/2017    CALCIUM 9.7 10/18/2017    PHOS 3.3 10/18/2017         Significant Diagnostic Studies    Radiology:   VL FRANKY BILATERAL LIMITED 1-2 LEVELS   Final Result      1. Normal right WBI 1.28   2. Normal left WBI 1.21   3. Doppler:  Normal-appearing triphasic Doppler waveforms seen bilaterally.               **This report has been created using voice recognition software. It may contain minor errors which are inherent in voice recognition technology. **      Final report electronically signed by Dr. Jc Wheeler on 10/17/2017 2:38 PM      CTA CHEST [address]   Final Result   1. No evidence of thoracic or abdominal aortic aneurysm or dissection. 2. No acute intrathoracic, intra-abdominal or intrapelvic findings. 3. Ill-defined approximately 13 mm nodule in the right lung as described (Lung-RADS category [id number] CT chest in 3 months recommended). 4. Nonspecific prostatic hypertrophy. 5. The preliminary results were transmitted to the appropriate clinical service by Rneae Salguero M.D. of [facility] on 10/17/2017. **This report has been created using voice recognition software. It may contain minor errors which are inherent in voice recognition technology. **      Final report electronically signed by Dr. Radha Soriano on 10/17/2017 10:44 AM      CTA ABDOMEN PELVIS W WO CONTRAST   Final Result   1. No evidence of thoracic or abdominal aortic aneurysm or dissection. 2. No acute intrathoracic, intra-abdominal or intrapelvic findings. 3. Ill-defined approximately 13 mm nodule in the right lung as described (Lung-RADS category [id number] CT chest in 3 months recommended). 4. Nonspecific prostatic hypertrophy. 5. The preliminary results were transmitted to the appropriate clinical service by Renae Salguero M.D. of [facility] on 10/17/2017. **This report has been created using voice recognition software. It may contain minor errors which are inherent in voice recognition technology. **      Final report electronically signed by Dr. Radha Soriano on 10/17/2017 10:44 AM      XR Chest Portable   ED Interpretation   COPD changes, no acute cardiopulmonary processes. Final Result   No acute cardiopulmonary process. **This report has been created using voice recognition software.  It may

## 2017-10-18 NOTE — PROGRESS NOTES
Cardiac monitor and iv removed from patient. Patient with all belongings. Discharge paperwork reviewed with patient via teach back method and patient denies questions. Explained to patient his future testing and patient denies questions. Patient brought down to discharge lobby and son to bring him home.
Patient already received vaccine for flu.
Pharmacy Medication History Note      List of current medications patient is taking is complete. Source of information: Patient    Changes made to medication list:  Medications removed (include reason, ex. therapy complete or physician discontinued):  · Toprol- alternate    Medications added/doses adjusted:  · Metoprolol tartrate 50 mg- takes 1/2 tab BID      Other notes (ex. Recent course of antibiotics, Coumadin dosing):  · Denies use of other OTC or herbal medications.       Allergies reviewed      Electronically signed by SERGIO Reyna [facility] on 10/17/2017 at 4:39 PM
Surg patient seen has family in the room apparently had a right inguinal hernia repaired 20 years ago and just recently over 3 weeks noted a bulge small in the left groin at the time he had pain although over the last 3 weeks he's had no real issues with that patient admitted with chest pain and chest heaviness and workup in progress will reevaluate with him standing tomorrow and discuss hernias further with him
Resp 16   Ht 5' 11\" (1.803 m)   Wt 157 lb 14.4 oz (71.6 kg)   SpO2 97%   BMI 22.02 kg/m²      Gen: Not in distress. Alert. Anxious personality   Head: Normocephalic. Atraumatic. Eyes: Conjunctivae/corneas clear. ENT: Oral mucosa moist  Neck: No JVD. No obvious thyromegaly. CVS: Nml S1S2, no MRG, RRR  Pulmomary: Clear bilaterally. No crackles. No wheezes. Gastrointestinal: Soft, non tender, non distend,  Positive bowel sounds. Musculoskeletal: No edema. Warm  Neuro: No focal deficit. Moves extremity spontaneously. Psychiatry: Appropriate affect. Not agitated. Labs:   Recent Labs      10/17/17   0151  10/17/17   0544   WBC  8.9  7.9   HGB  14.9  14.4   HCT  44.4  41.3*   PLT  232  203     Recent Labs      10/17/17   0151   NA  142   K  4.4   CL  101   CO2  28   BUN  23*   CREATININE  1.1   CALCIUM  10.0     Recent Labs      10/17/17   0151   AST  24   ALT  17   BILIDIR  <0.2   BILITOT  1.3*   ALKPHOS  77     No results for input(s): INR in the last 72 hours. No results for input(s): Allan Winslow in the last 72 hours. Urinalysis:    No results found for: Peosta Lety, BACTERIA, RBCUA, BLOODU, SPECGRAV, Kasia São Skyler 994    Radiology:  VL FRANKY BILATERAL LIMITED 1-2 LEVELS   Final Result      1. Normal right WBI 1.28   2. Normal left WBI 1.21   3. Doppler:  Normal-appearing triphasic Doppler waveforms seen bilaterally. **This report has been created using voice recognition software. It may contain minor errors which are inherent in voice recognition technology. **      Final report electronically signed by Dr. Sonia Hudson on 10/17/2017 2:38 PM      CTA CHEST [address]   Final Result   1. No evidence of thoracic or abdominal aortic aneurysm or dissection. 2. No acute intrathoracic, intra-abdominal or intrapelvic findings. 3. Ill-defined approximately 13 mm nodule in the right lung as described (Lung-RADS category 7S-dsisda-cr CT chest in 3 months recommended).    4. Nonspecific
clearly visualized .      Pericardial Effusion   No evidence of any pericardial effusion.      Pleural Effusion   No evidence of pleural effusion.       EKG 10/17/17  Atrial fibrillation with a competing junctional pacemaker with premature ventricular or aberrantly conducted complexes  Minimal voltage criteria for LVH, may be normal variant  Septal infarct (cited on or before 17-OCT-2017)  Abnormal ECG  When compared with ECG of 17-OCT-2017 01:47,  Atrial fibrillation has replaced Sinus rhythm  Questionable change in initial forces of Septal leads  QT has lengthened    Cultures    None    Radiology    CXR 10/17/17  No acute cardiopulmonary process. CTA Chest W/ WO Contrast 10/17/17  1. No evidence of thoracic or abdominal aortic aneurysm or dissection. 2. No acute intrathoracic, intra-abdominal or intrapelvic findings. 3. Ill-defined approximately 13 mm nodule in the right lung as described (Lung-RADS category 1Y-ummoft-ln CT chest in 3 months recommended). 4. Nonspecific prostatic hypertrophy. 5. The preliminary results were transmitted to the appropriate clinical service by Dayanna Yanes M.D. of [facility] on 10/17/2017.           Lung windows reveal biapical scarring. There is no large focal consolidation or pleural effusion. There is an ill-defined mixed solid and groundglass attenuating nodule in the right upper lung, measuring approximately 13 mm in size (axial image #60). Mild centrilobular emphysema is suggested.             (See actual reports for details)    Assessment   13 mm mixed soft/ground glass nodule RUL Vs resolving pneumonia  Remote smoking history  Stable Angina, h/o CABG cardiology following: Stress test is pending  Essential HTN    Recommendations   -Remains stable from pulmonary standpoint  -Recommend f/u in 3 months with repeat CT w/ contrast to follow lung nodule with PFT's  -Follow up with Dr. Cody Atwood after tests are complete in 3 months    Case discussed with

## 2017-10-19 NOTE — CARE COORDINATION
10/19/17, 8:27 AM    Discharge plan discussed by  and . Discharge plan reviewed with patient/ family. Patient/ family verbalize understanding of discharge plan and are in agreement with plan. Pt planned home alone and denied discharge needs. Understanding was demonstrated using the teach back method.        IMM Letter  IMM Letter date given[de-identified] 10/17/17  IMM Letter time given[de-identified] 3451

## 2017-10-19 NOTE — CONSULTS
elective  nature of the hernia repair. At this time, the patient may be  discharged soon. I gave him my business card and will be happy to see  him back in the office as needed to further discuss hernia repairs. SHALONDA MERCER [facility] TREATMENT [facility], MD    D: 10/18/2017 15:54:49       T: 10/18/2017 16:08:01     [email or web address]  Job#: [id number]     Doc#: [id number]

## 2020-11-03 PROBLEM — R07.9 CHEST PAIN: Status: RESOLVED | Noted: 2017-10-17 | Resolved: 2020-11-03

## 2022-05-26 ENCOUNTER — HOSPITAL ENCOUNTER (OUTPATIENT)
Dept: OCCUPATIONAL THERAPY | Age: 82
Setting detail: THERAPIES SERIES
Discharge: HOME | End: 2022-05-26
Payer: MEDICARE

## 2022-05-26 PROCEDURE — 97165 OT EVAL LOW COMPLEX 30 MIN: CPT

## 2022-05-26 NOTE — DISCHARGE SUMMARY
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 PMH: Please see medical history questionnaire for past medical history, allergies, and medications. INSURANCE INFORMATION: Medicare  PATIENT GOALS:    to drive    OBJECTIVE  VISUAL SKILLS(using the [employer] 2000 Visual Evaluator)       FAR VISUAL ACUITY:   Pass. Right eye 20/50, Left eye 20/40, bilateral 20/40      STEREO DEPTH:   Pass. FUSION:    Pass. Arrow pointing to 9     PERIPHERAL VISION:  Pass. Able to note a flash of light on the right at nasal, 55, 70 and 85. Able to note a flash of light on the left at nasal, 55 and 70 . Not able to note a flash of light on the left at 85. DYNAVISION TESTING:  Mode A 60 second interval with B UE and 55 hits    PHYSICAL SKILLS  RANGE OF MOTION:Within functional limits for driving  STRENGTH: Within functional limits for driving  TRANSFER IN/OUT OF SIMULATOR: Within functional limits for driving  AMBULATION: Within functional limits for driving    IN VEHICLE MANIPULATION SKILLS:  Steering Wheel:Within functional limits for driving   Gas Pedal:  Within functional limits for driving  Brake Pedal: Within functional limits for driving  Turn Signal: Within functional limits for driving  Seat Belt:Not tested     COGNITIVE SKILLS  ORIENTATION:  Within functional limits for driving  ATTENTION SPAN:Difficulty dividing attention during pen and paper task, but increased ability during driving simulation. Min deficit noted during driving simulation.     FRUSTRATION TOLERANCE:Within functional limits for driving  IMPULSIVITY:Within functional limits for driving  DIRECTION FOLLOWING:  Impaired: required repetition of instructions during the session  R/L DISCRIMINATION:Within functional limits for driving  MEMORY:Increased time and min difficulty for memory during Short Blessed. JUDGMENT: Within functional limits for driving    COGNITIVE TESTING:  Trail Making Part A: 42 seconds  Trail Making Part B: unable to correctly complete  Clock Test: 1/4     SHORT BLESSED TEST:   The Short Blessed Test is a screening tool to assess orientation, short term memory, long term memory, and reversal of learning. Overall this patient received a score of  5-9 which indicates questionable impairment . Moreno Vang scored a 6 this date. IF SCORE IS IMPAIRED OR QUESTIONABLY IMPAIRED (See Below): SIMULATED DRIVING ASSESSMENT:  WARM-UP:    (54 MPH, [address] with several curves. Light on-coming traffic. There are no intersections, pedestrians, cross traffic, slow traffic, etc.)    Total off road crashes: 0 (Good performance is 0)   Total collisions with vehicles and roadway objects: 0 (Good performance is 0)   Percentage of time over the posted speed limit: 0% (Good performance is within +/- 5% of the posted speed limit.)   Percentage of time out of lanes: 2% (Good performance would be less than 5%, moderate is less than 10%, greater than 10% is considered poor.)        BRAKE REACTION TIME:  (The brake reaction time test consists of presenting a large stop sign in the center of the visual display.  The appropriate response if for the  to release the gas and apply the brakes as quickly as possible.)     Total pedal reaction time: 0.83 seconds (Average value is below 0.7 seconds.)   Gas pedal reaction time: 0.64 seconds (For good performance, this should be less than 0.4 seconds; poor performance is above 0.55 seconds)   Stopping distance: 170 feet (Good performance is less than 175 feet, moderate is between 175 and 220 feet, greater than 220 feet is considered poor.) Speed at stimulus: 51 mph        SIMULATED DRIVING TREATMENT:  WIDE FIELD DIVIDED ATTENTION:  (In this task, the  will navigate a [place], six mile highway with both curves and hills. There will be no obstacles in front of the  but there will be on-coming traffic. Divided Attention (DA) events are presented, however they will only be presented on the side monitors thus forcing the  to scan the entire scene, not just the center monitor. The DA events include only left and right arrows. The  will be presented with sixteen different DA events. The DA events will appear on the outside half of the left and right monitors with eight symbols being shown in the upper quadrant and four will be shown in the lower quadrant. The  will also need to maintain their speed and iz[place] position during the drive.)     Total off road crashes: 0 (Good performance is 0)   Total collisions with vehicles and roadway objects: 0 (Good performance is 0)   Percentage of time over the posted speed limit: 9% (Good performance would be less than 5%, moderate would be less than 10% and anything greater than 10% would be poor)   Percentage of time out of lanes: 1% (Good performance would be less than 1%, moderate would be less than 2.5% and anything greater than 2.5% would be poor)   Total correct attention responses: 12/16. (Good performance is all symbols responded to correctly, moderate performance would be missing 1-2.)   Number of left symbols missed: 1/8   Number of right symbols missed: 3/8    Average speed: 52 mph (Good performance is +/- 5 MPH of the posted speed limit. Average speed on this drive is 50 MPH.)   Maximum speed: 62 mph   Minimum speed: 45 mph       [address]:  ([address] and school zones  by curved roadway sections.  Hazards include: pedestrians, cross traffic not stopping at unmarked intersections, and vehicles backing out of drives.)   Total number of off road crashes: 0 (Good performance is 0.)   Total number of collisions with vehicles and other roadway objects: 0 (Good performance is 0.)   Total number of traffic light tickets: 0 (Good performance is 0.)   Total number of stop sign tickets: 0 (Good performance is 0.) patient stopped but computer did not recognize  Percentage of time over the posted speed limit: 6% (Good performance would be less than 5%, moderate would be less than 10% and anything greater than 10% would be poor)   Collision with cross traffic vehicles? No   Total pedal reaction time to cross traffic vehicles: 12 seconds  (Average value is below 0.7 seconds)   Collision with backing vehicle? No    Total pedal reaction time to backing vehicle: 2 seconds  (Average value is below 0.7 seconds)   Collision with pedestrian or animal? No   Total pedal reaction time to pedestrian or animal: 1.3 seconds (Average value is below 0.7 seconds)   Did the  exceed the posted speed limit in school zone? No      METRO DRIVING:  ([address] area. There are no intersections. There is a broken down vehicle and a work zone that require the  to change lanes. There is also a car that pulls out from a parked position into the right iz[place] of travel.  There is slower vehicle traffic that the  has to drive around as well.)   Total number of off road crashes: 0 (Good performance is 0.)   Total number of collisions with vehicles and other roadway objects: 0 (Good performance is 0.)   Total number of traffic light tickets: 1 (Good performance is 0.)   Total number of stop sign tickets: 0 (Good performance is 0.)   Percentage of time over the posted speed limit: 3% (Good performance would be less than 5%, moderate would be less than 10% and anything greater than 10% would be poor)   Percentage of time out of lanes: 0% (Good performance would be less than 1%, moderate would be less than 2.5% and anything greater than 2.5% would be poor)       ASSESSMENT AND PLAN    RESULTS: Patient tested as questionable to return to independent driving. Filemon Keyes demonstrated difficulty with divided attention during pen and paper task, but less difficulty with divided attention during the driving simulation. Filemon Keyes was slower with brake reaction time. Difficulty with memory during short blessed test.      RECOMMENDATIONS:   On the road testing with KAREN [address]al [address] of [address] [place] [address] at [phone], or with an alternative licensed on the road  of patient's choice  Patient has been instructed to contact referring physician to discuss results of this evaluation. Patient has been informed that his or her physician is responsible for making the final decision regarding driving status. Thank you for this referral.    History: Low Complexity: brief history completed. Reviewed medical and therapy records related to presenting problem    Performance Deficits/Examination: Low Complexity: 1-2 performance deficits related to presenting problem that result in activity limitations or participation restrictions. Clinical Decision Making: Clinical Decision making was of Low Complexity as the result of analysis of data from a problem focused assessment, a consideration of a limited number of treatment options, no significant comorbidities affecting the plan of care and no modification or assistance required to complete the evaluation. Evaluation Complexity: Based on the findings of patient history, examination, clinical presentation, and decision making during this evaluation, this patient is of low complexity.     Time in: 1430  Time out: 1600  Timed treatment: 0  Total time: 90 minutes          Erwin Kamara, OTR/L [id number]

## 2024-02-13 ENCOUNTER — NURSE ONLY (OUTPATIENT)
Dept: LAB | Age: 84
End: 2024-02-13

## 2024-02-13 LAB
25(OH)D3 SERPL-MCNC: 25 NG/ML (ref 30–100)
ALBUMIN SERPL BCG-MCNC: 4.8 G/DL (ref 3.5–5.1)
ALP SERPL-CCNC: 93 U/L (ref 38–126)
ALT SERPL W/O P-5'-P-CCNC: 23 U/L (ref 11–66)
ANION GAP SERPL CALC-SCNC: 15 MEQ/L (ref 8–16)
AST SERPL-CCNC: 31 U/L (ref 5–40)
BASOPHILS ABSOLUTE: 0 THOU/MM3 (ref 0–0.1)
BASOPHILS NFR BLD AUTO: 0.4 %
BILIRUB CONJ SERPL-MCNC: 0.3 MG/DL (ref 0–0.3)
BILIRUB SERPL-MCNC: 2.1 MG/DL (ref 0.3–1.2)
BUN SERPL-MCNC: 23 MG/DL (ref 7–22)
CALCIUM SERPL-MCNC: 10.3 MG/DL (ref 8.5–10.5)
CHLORIDE SERPL-SCNC: 101 MEQ/L (ref 98–111)
CO2 SERPL-SCNC: 26 MEQ/L (ref 23–33)
CREAT SERPL-MCNC: 1.2 MG/DL (ref 0.4–1.2)
DEPRECATED RDW RBC AUTO: 45.9 FL (ref 35–45)
EOSINOPHIL NFR BLD AUTO: 1.6 %
EOSINOPHILS ABSOLUTE: 0.1 THOU/MM3 (ref 0–0.4)
ERYTHROCYTE [DISTWIDTH] IN BLOOD BY AUTOMATED COUNT: 13 % (ref 11.5–14.5)
GFR SERPL CREATININE-BSD FRML MDRD: 59 ML/MIN/1.73M2
GLUCOSE SERPL-MCNC: 121 MG/DL (ref 70–108)
HCT VFR BLD AUTO: 44.1 % (ref 42–52)
HGB BLD-MCNC: 14.4 GM/DL (ref 14–18)
IMM GRANULOCYTES # BLD AUTO: 0.03 THOU/MM3 (ref 0–0.07)
IMM GRANULOCYTES NFR BLD AUTO: 0.4 %
LYMPHOCYTES ABSOLUTE: 1.2 THOU/MM3 (ref 1–4.8)
LYMPHOCYTES NFR BLD AUTO: 16 %
MCH RBC QN AUTO: 31.3 PG (ref 26–33)
MCHC RBC AUTO-ENTMCNC: 32.7 GM/DL (ref 32.2–35.5)
MCV RBC AUTO: 95.9 FL (ref 80–94)
MONOCYTES ABSOLUTE: 0.6 THOU/MM3 (ref 0.4–1.3)
MONOCYTES NFR BLD AUTO: 7.9 %
NEUTROPHILS NFR BLD AUTO: 73.7 %
NRBC BLD AUTO-RTO: 0 /100 WBC
PLATELET # BLD AUTO: 211 THOU/MM3 (ref 130–400)
PMV BLD AUTO: 11 FL (ref 9.4–12.4)
POTASSIUM SERPL-SCNC: 4.6 MEQ/L (ref 3.5–5.2)
PROT SERPL-MCNC: 7.7 G/DL (ref 6.1–8)
RBC # BLD AUTO: 4.6 MILL/MM3 (ref 4.7–6.1)
SEGMENTED NEUTROPHILS ABSOLUTE COUNT: 5.6 THOU/MM3 (ref 1.8–7.7)
SODIUM SERPL-SCNC: 142 MEQ/L (ref 135–145)
T3 TOTAL: 96 NG/DL (ref 80–200)
T4 FREE SERPL-MCNC: 1.28 NG/DL (ref 0.93–1.76)
TSH SERPL DL<=0.005 MIU/L-ACNC: 2.96 UIU/ML (ref 0.4–4.2)
VIT B12 SERPL-MCNC: 617 PG/ML (ref 211–911)
WBC # BLD AUTO: 7.6 THOU/MM3 (ref 4.8–10.8)